# Patient Record
Sex: MALE | Race: WHITE | NOT HISPANIC OR LATINO | Employment: FULL TIME | URBAN - METROPOLITAN AREA
[De-identification: names, ages, dates, MRNs, and addresses within clinical notes are randomized per-mention and may not be internally consistent; named-entity substitution may affect disease eponyms.]

---
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
## 2021-02-08 ENCOUNTER — APPOINTMENT (OUTPATIENT)
Dept: CT IMAGING | Facility: HOSPITAL | Age: 21
End: 2021-02-08

## 2021-02-08 ENCOUNTER — HOSPITAL ENCOUNTER (OUTPATIENT)
Facility: HOSPITAL | Age: 21
Setting detail: OBSERVATION
Discharge: HOME | End: 2021-02-09
Attending: EMERGENCY MEDICINE | Admitting: SURGERY

## 2021-02-08 ENCOUNTER — APPOINTMENT (OUTPATIENT)
Dept: MRI IMAGING | Facility: HOSPITAL | Age: 21
End: 2021-02-08

## 2021-02-08 ENCOUNTER — APPOINTMENT (OUTPATIENT)
Dept: GENERAL RADIOLOGY | Facility: HOSPITAL | Age: 21
End: 2021-02-08

## 2021-02-08 DIAGNOSIS — S06.301A TRAUMATIC INTRACRANIAL HEMORRHAGE WITH LOSS OF CONSCIOUSNESS OF 30 MINUTES OR LESS, INITIAL ENCOUNTER (HCC): Primary | ICD-10-CM

## 2021-02-08 DIAGNOSIS — S40.011A CONTUSION OF MULTIPLE SITES OF RIGHT SHOULDER, INITIAL ENCOUNTER: ICD-10-CM

## 2021-02-08 LAB
ALBUMIN SERPL-MCNC: 4.7 G/DL (ref 3.5–5.2)
ALBUMIN/GLOB SERPL: 1.6 G/DL
ALP SERPL-CCNC: 100 U/L (ref 39–117)
ALT SERPL W P-5'-P-CCNC: 45 U/L (ref 1–41)
AMPHET+METHAMPHET UR QL: NEGATIVE
ANION GAP SERPL CALCULATED.3IONS-SCNC: 10 MMOL/L (ref 5–15)
APTT PPP: 26.2 SECONDS (ref 24–31)
AST SERPL-CCNC: 23 U/L (ref 1–40)
BARBITURATES UR QL SCN: NEGATIVE
BASOPHILS # BLD AUTO: 0 10*3/MM3 (ref 0–0.2)
BASOPHILS NFR BLD AUTO: 0.5 % (ref 0–1.5)
BENZODIAZ UR QL SCN: NEGATIVE
BILIRUB SERPL-MCNC: 1.3 MG/DL (ref 0–1.2)
BUN SERPL-MCNC: 9 MG/DL (ref 6–20)
BUN/CREAT SERPL: 13.6 (ref 7–25)
CALCIUM SPEC-SCNC: 9.5 MG/DL (ref 8.6–10.5)
CANNABINOIDS SERPL QL: NEGATIVE
CHLORIDE SERPL-SCNC: 103 MMOL/L (ref 98–107)
CO2 SERPL-SCNC: 27 MMOL/L (ref 22–29)
COCAINE UR QL: NEGATIVE
CREAT SERPL-MCNC: 0.66 MG/DL (ref 0.76–1.27)
DEPRECATED RDW RBC AUTO: 39.8 FL (ref 37–54)
EOSINOPHIL # BLD AUTO: 0.2 10*3/MM3 (ref 0–0.4)
EOSINOPHIL NFR BLD AUTO: 2.7 % (ref 0.3–6.2)
ERYTHROCYTE [DISTWIDTH] IN BLOOD BY AUTOMATED COUNT: 13.4 % (ref 12.3–15.4)
GFR SERPL CREATININE-BSD FRML MDRD: >150 ML/MIN/1.73
GLOBULIN UR ELPH-MCNC: 3 GM/DL
GLUCOSE SERPL-MCNC: 96 MG/DL (ref 65–99)
HCT VFR BLD AUTO: 40.6 % (ref 37.5–51)
HGB BLD-MCNC: 14.2 G/DL (ref 13–17.7)
INR PPP: 1.02 (ref 0.93–1.1)
LYMPHOCYTES # BLD AUTO: 2.3 10*3/MM3 (ref 0.7–3.1)
LYMPHOCYTES NFR BLD AUTO: 27.2 % (ref 19.6–45.3)
MCH RBC QN AUTO: 30 PG (ref 26.6–33)
MCHC RBC AUTO-ENTMCNC: 35.1 G/DL (ref 31.5–35.7)
MCV RBC AUTO: 85.3 FL (ref 79–97)
METHADONE UR QL SCN: NEGATIVE
MONOCYTES # BLD AUTO: 0.5 10*3/MM3 (ref 0.1–0.9)
MONOCYTES NFR BLD AUTO: 6 % (ref 5–12)
NEUTROPHILS NFR BLD AUTO: 5.4 10*3/MM3 (ref 1.7–7)
NEUTROPHILS NFR BLD AUTO: 63.6 % (ref 42.7–76)
NRBC BLD AUTO-RTO: 0.1 /100 WBC (ref 0–0.2)
OPIATES UR QL: NEGATIVE
OXYCODONE UR QL SCN: NEGATIVE
PLATELET # BLD AUTO: 295 10*3/MM3 (ref 140–450)
PMV BLD AUTO: 7.3 FL (ref 6–12)
POTASSIUM SERPL-SCNC: 3.8 MMOL/L (ref 3.5–5.2)
PROT SERPL-MCNC: 7.7 G/DL (ref 6–8.5)
PROTHROMBIN TIME: 11.2 SECONDS (ref 9.6–11.7)
RBC # BLD AUTO: 4.76 10*6/MM3 (ref 4.14–5.8)
SODIUM SERPL-SCNC: 140 MMOL/L (ref 136–145)
WBC # BLD AUTO: 8.6 10*3/MM3 (ref 3.4–10.8)

## 2021-02-08 PROCEDURE — 99243 OFF/OP CNSLTJ NEW/EST LOW 30: CPT | Performed by: NEUROLOGICAL SURGERY

## 2021-02-08 PROCEDURE — 70450 CT HEAD/BRAIN W/O DYE: CPT

## 2021-02-08 PROCEDURE — G0378 HOSPITAL OBSERVATION PER HR: HCPCS

## 2021-02-08 PROCEDURE — 80307 DRUG TEST PRSMV CHEM ANLYZR: CPT | Performed by: EMERGENCY MEDICINE

## 2021-02-08 PROCEDURE — 85610 PROTHROMBIN TIME: CPT | Performed by: EMERGENCY MEDICINE

## 2021-02-08 PROCEDURE — 73030 X-RAY EXAM OF SHOULDER: CPT

## 2021-02-08 PROCEDURE — 85730 THROMBOPLASTIN TIME PARTIAL: CPT | Performed by: EMERGENCY MEDICINE

## 2021-02-08 PROCEDURE — 80053 COMPREHEN METABOLIC PANEL: CPT | Performed by: EMERGENCY MEDICINE

## 2021-02-08 PROCEDURE — 72125 CT NECK SPINE W/O DYE: CPT

## 2021-02-08 PROCEDURE — 99284 EMERGENCY DEPT VISIT MOD MDM: CPT

## 2021-02-08 PROCEDURE — 71046 X-RAY EXAM CHEST 2 VIEWS: CPT

## 2021-02-08 PROCEDURE — 70551 MRI BRAIN STEM W/O DYE: CPT

## 2021-02-08 PROCEDURE — 85025 COMPLETE CBC W/AUTO DIFF WBC: CPT | Performed by: EMERGENCY MEDICINE

## 2021-02-08 RX ORDER — SODIUM CHLORIDE 0.9 % (FLUSH) 0.9 %
10 SYRINGE (ML) INJECTION AS NEEDED
Status: DISCONTINUED | OUTPATIENT
Start: 2021-02-08 | End: 2021-02-09 | Stop reason: HOSPADM

## 2021-02-08 RX ORDER — ONDANSETRON 2 MG/ML
4 INJECTION INTRAMUSCULAR; INTRAVENOUS ONCE
Status: DISCONTINUED | OUTPATIENT
Start: 2021-02-08 | End: 2021-02-09 | Stop reason: HOSPADM

## 2021-02-08 RX ORDER — ONDANSETRON 2 MG/ML
4 INJECTION INTRAMUSCULAR; INTRAVENOUS EVERY 6 HOURS PRN
Status: DISCONTINUED | OUTPATIENT
Start: 2021-02-08 | End: 2021-02-09 | Stop reason: HOSPADM

## 2021-02-08 RX ORDER — SODIUM CHLORIDE 9 MG/ML
10 INJECTION INTRAVENOUS EVERY 12 HOURS SCHEDULED
Status: DISCONTINUED | OUTPATIENT
Start: 2021-02-08 | End: 2021-02-08

## 2021-02-08 RX ORDER — ACETAMINOPHEN 325 MG/1
650 TABLET ORAL EVERY 6 HOURS PRN
Status: DISCONTINUED | OUTPATIENT
Start: 2021-02-08 | End: 2021-02-09 | Stop reason: HOSPADM

## 2021-02-08 RX ADMIN — Medication 10 ML: at 21:44

## 2021-02-08 RX ADMIN — Medication 10 ML: at 21:43
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
## 2021-02-08 NOTE — PLAN OF CARE
Goal Outcome Evaluation:   Pt is A&O x4, some pain in shoulder and head. Pt has Tylenol but did not need it at this time. Neuro checks Q4hrs

## 2021-02-09 ENCOUNTER — APPOINTMENT (OUTPATIENT)
Dept: CT IMAGING | Facility: HOSPITAL | Age: 21
End: 2021-02-09

## 2021-02-09 VITALS
WEIGHT: 227.96 LBS | TEMPERATURE: 98.3 F | OXYGEN SATURATION: 96 % | BODY MASS INDEX: 32.63 KG/M2 | SYSTOLIC BLOOD PRESSURE: 127 MMHG | HEART RATE: 57 BPM | RESPIRATION RATE: 14 BRPM | HEIGHT: 70 IN | DIASTOLIC BLOOD PRESSURE: 60 MMHG

## 2021-02-09 DIAGNOSIS — S06.301D TRAUMATIC INTRACRANIAL HEMORRHAGE WITH LOSS OF CONSCIOUSNESS OF 30 MINUTES OR LESS, SUBSEQUENT ENCOUNTER: Primary | ICD-10-CM

## 2021-02-09 PROBLEM — Q28.3 CEREBRAL CAVERNOMA: Status: ACTIVE | Noted: 2021-02-09

## 2021-02-09 PROCEDURE — G0378 HOSPITAL OBSERVATION PER HR: HCPCS

## 2021-02-09 PROCEDURE — 99214 OFFICE O/P EST MOD 30 MIN: CPT | Performed by: NEUROLOGICAL SURGERY

## 2021-02-09 PROCEDURE — 70450 CT HEAD/BRAIN W/O DYE: CPT
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
## 2021-02-09 NOTE — PLAN OF CARE
Goal Outcome Evaluation:  Plan of Care Reviewed With: patient, mother  Progress: no change  Outcome Summary: Vital signs stable. Remains neurologically intact. Repeat CT today.
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
## 2021-02-09 NOTE — PROGRESS NOTES
Discharge Planning Assessment   [place]     Patient Name: León Fermin  MRN: [id number]  Today's Date: 2/9/2021    Admit Date: 2/8/2021    Discharge Needs Assessment     Row Name 02/09/21 1310       Living Environment    Lives With  friend(s)    Current Living Arrangements  home/apartment/condo    Primary Care Provided by  self    Able to Return to Prior Arrangements  yes       Resource/Environmental Concerns    Resource/Environmental Concerns  none    Transportation Concerns  car, none       Transition Planning    Patient/Family Anticipates Transition to  home    Patient/Family Anticipated Services at Transition  none    Transportation Anticipated  car, drives self;family or friend will provide Mom will transport to home at [place]       Discharge Needs Assessment    Equipment Currently Used at Home  none    Concerns to be Addressed  no discharge needs identified    Anticipated Changes Related to Illness  none    Equipment Needed After Discharge  none        Discharge Plan     Row Name 02/09/21 1312       Plan    Plan  Routine d/c to home      Plan Comments  Spoke to patient and mom in room with mask and goggles maintaining 6 ft for less than 15 min.  Patient states he lives at home with a friend and is independent with ADL'S. PCP is Zeny Weaver NP. Pharmacy is [facility] [address] DC Barriers - Repeat CT today         Continued Care and Services - Admitted Since 2/8/2021          Demographic Summary     Row Name 02/09/21 1310       General Information    Admission Type  observation    Arrived From  emergency department    Referral Source  admission list    Reason for Consult  discharge planning    Preferred Language  English        Functional Status     Row Name 02/09/21 1317       Mental Status    General Appearance WDL  WDL    Row Name 02/09/21 1310       Functional Status, IADL    Medications  independent    Meal Preparation  independent    Housekeeping  independent    Laundry  independent    Shopping   independent        Szuanna Aldana RN, CM  Office Phone [phone]  Cell [phone]

## 2021-02-10 NOTE — PROGRESS NOTES
Case Management Discharge Note                Selected Continued Care - Discharged on 2/9/2021 Admission date: 2/8/2021 - Discharge disposition: Home or Self Care                 Final Discharge Disposition Code: 01 - home or self-care

## 2021-02-13 ENCOUNTER — HOSPITAL ENCOUNTER (OUTPATIENT)
Dept: CT IMAGING | Facility: HOSPITAL | Age: 21
Discharge: HOME | End: 2021-02-13
Admitting: NURSE PRACTITIONER

## 2021-02-13 DIAGNOSIS — S06.301D TRAUMATIC INTRACRANIAL HEMORRHAGE WITH LOSS OF CONSCIOUSNESS OF 30 MINUTES OR LESS, SUBSEQUENT ENCOUNTER: ICD-10-CM

## 2021-02-13 PROCEDURE — 70450 CT HEAD/BRAIN W/O DYE: CPT

## 2021-02-14 ENCOUNTER — APPOINTMENT (OUTPATIENT)
Dept: CT IMAGING | Facility: HOSPITAL | Age: 21
End: 2021-02-14

## 2021-02-15 DIAGNOSIS — Q28.3 CAVERNOUS MALFORMATION: Primary | ICD-10-CM

## 2021-02-17 ENCOUNTER — TELEPHONE (OUTPATIENT)
Dept: NEUROSURGERY | Facility: CLINIC | Age: 21
End: 2021-02-17

## 2021-02-17 NOTE — TELEPHONE ENCOUNTER
Caller: CRISTINE MURRAY    Relationship: MOTHER    Best call back number: [phone]    What form or medical record are you requesting: A RETURN TO WORK LETTER AND A LETTER STATING THAT THE PATIENT HAS BEEN OFF WORK SINCE HE WAS IN THE HOSPITAL     Who is requesting this form or medical record from you: HIS EMPLOYER, [employer]    How would you like to receive the form or medical records (pick-up, mail, fax):    FAX TO PATIENT'S MOTHER CRISTINE:   [phone]    Timeframe paperwork needed: ASAP     Additional notes: THE PATIENT WAS IN THE HOSPITAL 2/8/21-2/9/21 AND WAS SEEN BY DR. VO. CRISTINE ALSO ASKED IT TO BE NOTED THAT THEY ARE VERY GRATEFUL TO DR. VO FOR ALL HE'S DONE FOR THE PATIENT.

## 2025-05-13 NOTE — PROGRESS NOTES
Maxine talked with the pharmacist about the medication that was too expensive. Said she talked with you about it today at her visit.     The pharmacist recommended estrace vaginal cream.    Neurosurgery progress:     Patient remains clinically stable, with mild headache.  Neuro intact.  Review of MRI shows presence of multiple small cavernoma's with hemosiderin deposition.  Cavernoma's are multiple ( approx 10) throughout both hemispheres perhaps approximately.  CT scan shows slight increase in size of the hemorrhage associated with a cavernoma in the inferior frontal region on the right.  We will continue current care, with follow-up CT scan of the head tomorrow morning.    Ryan SOLIS, NATALOI, KRISHNA      Addendum: Patient continues to recover.  Denies nausea vomiting is no stiff neck and the patient is neurologically intact.  Review of CT scan shows very slight increase in the hemorrhage associated with a cavernoma in the right inferior frontal region.  Slight increase is due to evolution of hemorrhage in association cavernoma.  Discussed hospital care imaging studies and summary results with patient and mother.  We will discharge patient home today with follow-up in neurosurgery clinic in 7 days.    Ryan SOLIS, NATALIO, ALEISHAS